# Patient Record
Sex: FEMALE | Employment: FULL TIME
[De-identification: names, ages, dates, MRNs, and addresses within clinical notes are randomized per-mention and may not be internally consistent; named-entity substitution may affect disease eponyms.]

---

## 2023-05-23 ENCOUNTER — HOSPITAL ENCOUNTER (OUTPATIENT)
Facility: HOSPITAL | Age: 53
Setting detail: RECURRING SERIES
Discharge: HOME | End: 2023-05-26
Payer: COMMERCIAL

## 2023-05-23 PROCEDURE — 97530 THERAPEUTIC ACTIVITIES: CPT

## 2023-05-23 PROCEDURE — 97162 PT EVAL MOD COMPLEX 30 MIN: CPT

## 2023-05-23 PROCEDURE — 97110 THERAPEUTIC EXERCISES: CPT

## 2023-05-23 NOTE — PROGRESS NOTES
[facility] at the [facility]   [address], [address] [facility], [address] [facility]   Phone: [phone]     Fax: [phone]       Plan of Care/ Statement of Necessity for Physical Therapy Services    Patient name: Latoya Marrero Start of Care: 2023   Referral source: Maxx Brooks MD : 1970    Medical Diagnosis: Other abnormalities of gait and mobility [R26.89]  Pain in right foot [M79.671]       Onset Date:15 years    Treatment Diagnosis: M79.671  RIGHT FOOT PAIN                             Prior Hospitalization: see medical history Provider#: [id number]   Medications: Verified on Patient Summary List     Comorbidities: septoplasty, arthritis, depression, 3 cigarettes a day, left SIJ pain  Prior Level of Function: walking 2.5miles 5 days a week, ADLs without right foot pain      The Plan of Care and following information is based on the information from the initial evaluation. Assessment/ key information: Pt is a 52yo female presenting to therapy with c/c right great toe pain ongoing for at least 15 years however recent increase in sharp pain and bunion noted. Pt reporting wanting to do conservative measures first before surgery. Pain increases to 8/10 with walking especially push off, increased activity. Pain decreases to 0/10 with rest, meds. Pt demonstrates WFL ankle ROM, decreased great toe extension ROM bilaterally, decreased gastroc and hip strength bilaterally, instability with SLS right > left, decreased trunk rotation bilaterally, poor gait mechanics with decreased push off, toe out, no trunk rotation and increased time noted in right stance. Pt would benefit from skilled PT services to address the above impairments to allow pt to complete ADLs with increased ease and less pain.      Evaluation Complexity HistoryHIGH Complexity :3+ comorbidities / personal factors will impact the outcome/ POC  ; Examination MEDIUM Complexity : 3 Standardized tests and
[] Severe    Callus formation: lateral right great toe, under 2nd met       Swelling:   Left (cm) Right (cm)   Figure 8:       Special tests:  Anterior Drawer: [] Neg    [] Pos  Posterior Drawer:  [] Neg    [] Pos  Toby's Sign:  [] Neg    [] Pos  Gregg Test: [] Neg    [] Pos  Inversion stress(CFL) [] Neg    [] Pos    Other tests/ comments:  SL balance: right WFL, left WFL  Trunk rotation ROM: right 15in, left 15. in  Lumbar flexion: to floor        Patient will continue to benefit from skilled PT / OT services to modify and progress therapeutic interventions, analyze and address functional mobility deficits, analyze and address ROM deficits, analyze and address strength deficits, analyze and address soft tissue restrictions, analyze and cue for proper movement patterns, analyze and modify for postural abnormalities, analyze and address imbalance/dizziness, and instruct in home and community integration to address functional deficits and attain remaining goals. Progress toward goals / Updated goals:  []  See Progress Note/Recertification    See POC for goal and assessment     PLAN  yes Continue plan of care  []  Upgrade activities as tolerated  []  Discharge due to :  []  Other:    Seb Ruiz, PT    5/23/2023    8:40 AM    No future appointments.

## 2023-06-01 ENCOUNTER — HOSPITAL ENCOUNTER (OUTPATIENT)
Facility: HOSPITAL | Age: 53
Setting detail: RECURRING SERIES
Discharge: HOME | End: 2023-06-04
Payer: COMMERCIAL

## 2023-06-01 PROCEDURE — 97112 NEUROMUSCULAR REEDUCATION: CPT

## 2023-06-01 PROCEDURE — 97530 THERAPEUTIC ACTIVITIES: CPT

## 2023-06-01 PROCEDURE — 97110 THERAPEUTIC EXERCISES: CPT

## 2023-06-16 ENCOUNTER — HOSPITAL ENCOUNTER (OUTPATIENT)
Facility: HOSPITAL | Age: 53
Setting detail: RECURRING SERIES
Discharge: HOME | End: 2023-06-19
Payer: COMMERCIAL

## 2023-06-16 PROCEDURE — 97112 NEUROMUSCULAR REEDUCATION: CPT

## 2023-06-16 PROCEDURE — 97110 THERAPEUTIC EXERCISES: CPT

## 2023-06-16 PROCEDURE — 97530 THERAPEUTIC ACTIVITIES: CPT

## 2023-06-23 ENCOUNTER — APPOINTMENT (OUTPATIENT)
Facility: HOSPITAL | Age: 53
End: 2023-06-23
Payer: COMMERCIAL

## 2023-06-30 ENCOUNTER — APPOINTMENT (OUTPATIENT)
Facility: HOSPITAL | Age: 53
End: 2023-06-30
Payer: COMMERCIAL

## 2023-07-05 ENCOUNTER — TELEPHONE (OUTPATIENT)
Facility: HOSPITAL | Age: 53
End: 2023-07-05

## 2023-07-05 ENCOUNTER — HOSPITAL ENCOUNTER (OUTPATIENT)
Facility: HOSPITAL | Age: 53
Setting detail: RECURRING SERIES
End: 2023-07-05
Payer: COMMERCIAL

## 2023-07-11 ENCOUNTER — HOSPITAL ENCOUNTER (OUTPATIENT)
Facility: HOSPITAL | Age: 53
Setting detail: RECURRING SERIES
Discharge: HOME | End: 2023-07-14
Payer: COMMERCIAL

## 2023-07-11 PROCEDURE — 97110 THERAPEUTIC EXERCISES: CPT

## 2023-07-11 PROCEDURE — 97530 THERAPEUTIC ACTIVITIES: CPT

## 2023-07-11 PROCEDURE — 97112 NEUROMUSCULAR REEDUCATION: CPT

## 2023-07-11 NOTE — PROGRESS NOTES
[facility] at the [place] [address] [address]  Phone: [phone]      Fax:  [phone]     Progress Note  Patient name: Hillary Sanchez Start of Care: 2023   Referral source: Noa Tomas MD : 1970               Medical Diagnosis: Other abnormalities of gait and mobility [R26.89]  Pain in right foot [M79.671]        Onset Date:15 years               Treatment Diagnosis: M79.671  RIGHT FOOT PAIN                             Prior Hospitalization: see medical history Provider#: [id number]   Medications: Verified on Patient Summary List      Comorbidities: septoplasty, arthritis, depression, 3 cigarettes a day, left SIJ pain  Prior Level of Function: walking 2.5miles 5 days a week, ADLs without right foot pain               5  Visits from Start of Care: 4                                      Missed Visits: 0     Updated Goals/Measure of Progress: To be achieved in 5 weeks:     Short Term Goals: STG- To be accomplished in 4 week(s):  1. Pt will be independent with HEP to encourage prophylaxis. Eval: HEP dispensed   Current: PN 23: toe exercises daily progressing     2. Pt trunk rotation will improve to 14in or less to be able to walk with improved mechanics  Eval: 15.5in left, 15in right   Current: 23: 15 left, 14.5 right progressing      Long Term Goals: LTG- To be accomplished in 8 week(s):  1. Pt will report max pain 1/10 to complete ADLs with increased ease. Eval: 8/10   Current: 23 0-1/10 met  PN 23: patient reports 2/10 pain on arrival, 6/10 following walking 3 1/2 miles, progressing     2. Pt will be able to complete 20 SL HR on right to demonstrate improved functional gastroc strength for push off with gait. Eval:1 SL HR with pain  Current: 23: performs 20 eccentric HR with cues for weight shift into the lateral side of the foot progressing     3.   Pt right great toe AROM will improve to 45* to allow pt

## 2023-07-11 NOTE — PROGRESS NOTES
PHYSICAL / OCCUPATIONAL THERAPY - DAILY TREATMENT NOTE (updated )    Patient Name: Nilda Galvan    Date: 2023    : 1970  Insurance: Payor: Joe Webster / Plan: Bismark Goss / Product Type: *No Product type* /      Patient  verified Yes     Visit #   Current / Total 5 8   Time   In / Out 4:03 4:42   Pain   In / Out 2 0   Subjective Functional Status/Changes: Patient reports she started walking again and had significant pain after walking 3 1/2 miles, to pain up to 6/10, with sharp, stabbing pain 8/10 lingering. Changes to: Allergies, Med Hx, Sx Hx?   no       TREATMENT AREA =  Other abnormalities of gait and mobility [R26.89]  Pain in right foot [M79.671]    OBJECTIVE    Therapeutic Procedures: Tx Min Billable or 1:1 Min (if diff from Tx Min) Procedure, Rationale, Specifics   8  05254 Therapeutic Exercise (timed):  increase ROM, strength, coordination, balance, and proprioception to improve patient's ability to progress to PLOF and address remaining functional goals. (see flow sheet as applicable)     Details if applicable:       16  37509 Therapeutic Activity (timed):  use of dynamic activities replicating functional movements to increase ROM, strength, coordination, balance, and proprioception in order to improve patient's ability to progress to PLOF and address remaining functional goals. (see flow sheet as applicable)     Details if applicable:     15  54558 Neuromuscular Re-Education (timed):  improve balance, coordination, kinesthetic sense, posture, core stability and proprioception to improve patient's ability to develop conscious control of individual muscles and awareness of position of extremities in order to progress to PLOF and address remaining functional goals.  (see flow sheet as applicable)     Details if applicable:            Details if applicable:            Details if applicable:     44   BC Totals Reminder: bill using total billable min of TIMED therapeutic

## 2023-07-24 ENCOUNTER — TELEPHONE (OUTPATIENT)
Facility: HOSPITAL | Age: 53
End: 2023-07-24